# Patient Record
Sex: FEMALE | Race: WHITE | NOT HISPANIC OR LATINO | Employment: FULL TIME | URBAN - METROPOLITAN AREA
[De-identification: names, ages, dates, MRNs, and addresses within clinical notes are randomized per-mention and may not be internally consistent; named-entity substitution may affect disease eponyms.]

---

## 2018-05-22 ENCOUNTER — OFFICE VISIT (OUTPATIENT)
Dept: OBGYN CLINIC | Facility: MEDICAL CENTER | Age: 33
End: 2018-05-22
Payer: COMMERCIAL

## 2018-05-22 VITALS — DIASTOLIC BLOOD PRESSURE: 70 MMHG | SYSTOLIC BLOOD PRESSURE: 102 MMHG | BODY MASS INDEX: 30.12 KG/M2 | WEIGHT: 164.7 LBS

## 2018-05-22 DIAGNOSIS — N89.8 VAGINAL DISCHARGE: ICD-10-CM

## 2018-05-22 DIAGNOSIS — B96.89 BV (BACTERIAL VAGINOSIS): ICD-10-CM

## 2018-05-22 DIAGNOSIS — Z01.419 ENCOUNTER FOR ROUTINE GYNECOLOGICAL EXAMINATION WITH PAPANICOLAOU SMEAR OF CERVIX: Primary | ICD-10-CM

## 2018-05-22 DIAGNOSIS — N76.0 BV (BACTERIAL VAGINOSIS): ICD-10-CM

## 2018-05-22 PROCEDURE — S0610 ANNUAL GYNECOLOGICAL EXAMINA: HCPCS | Performed by: OBSTETRICS & GYNECOLOGY

## 2018-05-22 RX ORDER — METRONIDAZOLE 500 MG/1
500 TABLET ORAL EVERY 12 HOURS SCHEDULED
Qty: 14 TABLET | Refills: 0 | Status: SHIPPED | OUTPATIENT
Start: 2018-05-22 | End: 2018-05-29

## 2018-05-22 NOTE — PROGRESS NOTES
ASSESSMENT & PLAN: Zi Chaudhry is a 35 y o  [id number] with normal gynecologic exam     1   Routine well woman exam done today  2  Pap and HPV:   Pap and cotesting was done today  Current ASCCP Guidelines reviewed  / g/C sent from pap   4  The following were reviewed in today's visit: breast self exam, exercise and healthy diet  5  BV on wet mount and KOH : metronidazole sent to pharmacy    CC:  Annual Gynecologic Examination    HPI: Zi Chaudhry is a 35 y o  [id number] who presents for annual gynecologic examination  She has the following concerns:  External vulvar itching and some mild discharge     Health Maintenance:    She wears her seatbelt routinely  She does perform regular monthly self breast exams  She feels safe at home  History reviewed  No pertinent past medical history  Past Surgical History:   Procedure Laterality Date     SECTION      TUBAL LIGATION         Past OB/Gyn History:  OB History      Para Term  AB Living    3 3 3     3    SAB TAB Ectopic Multiple Live Births            3           Pt does not have menstrual issues  History of sexually transmitted infection: No   Patient is currently sexually active  The current method of family planning is tubal ligation  Family History   Problem Relation Age of Onset    Breast cancer Paternal Grandmother        Social History:  Social History     Social History    Marital status: Single     Spouse name: N/A    Number of children: N/A    Years of education: N/A     Occupational History    Not on file       Social History Main Topics    Smoking status: Current Every Day Smoker    Smokeless tobacco: Never Used    Alcohol use No    Drug use: No    Sexual activity: Yes     Partners: Male     Birth control/ protection: Female Sterilization     Other Topics Concern    Not on file     Social History Narrative    No narrative on file       No Known Allergies    Current Outpatient Prescriptions:    metroNIDAZOLE (FLAGYL) 500 mg tablet, Take 1 tablet (500 mg total) by mouth every 12 (twelve) hours for 7 days, Disp: 14 tablet, Rfl: 0    Review of Systems:    Review of Systems  Constitutional :no fever, feels well, no tiredness, no recent weight gain or loss  ENT: no ear ache, no loss of hearing, no nosebleeds or nasal discharge, no sore throat or hoarseness  Cardiovascular: no complaints of slow or fast heart beat, no chest pain, no palpitations, no leg claudication or lower extremity edema  Respiratory: no complaints of shortness of shortness of breath, no STERN  Breasts:no complaints of breast pain, breast lump, or nipple discharge  Gastrointestinal: no complaints of abdominal pain, constipation, nausea, vomiting, or diarrhea or bloody stools  Genitourinary :as noted in HPI  Musculoskeletal: no complaints of arthralgia, no myalgia, no joint swelling or stiffness, no limb pain or swelling  Integumentary: no complaints of skin rash or lesion, itching or dry skin  Neurological: no complaints of headache, no confusion, no numbness or tingling, no dizziness or fainting    Objective      /70   Wt 74 7 kg (164 lb 11 2 oz)   LMP 05/02/2018 (Exact Date)   BMI 30 12 kg/m²     General:   appears stated age, cooperative, alert normal mood and affect   Heart: regular rate and rhythm, S1, S2 normal, no murmur, click, rub or gallop   Lungs: clear to auscultation bilaterally   Breasts: normal appearance, no masses or tenderness, No axillary or supraclavicular adenopathy   Abdomen: soft, non-tender, without masses or organomegaly   Vulva: normal   Vagina: normal vagina/ large amount of greenish discharge    Urethra: normal   Cervix: Normal, no discharge  Nontender     Uterus: normal size, contour, position, consistency, mobility, non-tender   Adnexa: no mass, fullness, tenderness   Skin normal skin turgor and no rashes / lipoma on upper right back   Psychiatric orientation to person, place, and time: normal  mood and affect: normal

## 2018-06-18 ENCOUNTER — OFFICE VISIT (OUTPATIENT)
Dept: URGENT CARE | Age: 33
End: 2018-06-18
Payer: COMMERCIAL

## 2018-06-18 VITALS
HEIGHT: 60 IN | OXYGEN SATURATION: 99 % | TEMPERATURE: 100.2 F | RESPIRATION RATE: 16 BRPM | BODY MASS INDEX: 31.49 KG/M2 | SYSTOLIC BLOOD PRESSURE: 126 MMHG | DIASTOLIC BLOOD PRESSURE: 58 MMHG | HEART RATE: 100 BPM | WEIGHT: 160.4 LBS

## 2018-06-18 DIAGNOSIS — J02.0 STREP THROAT: Primary | ICD-10-CM

## 2018-06-18 PROCEDURE — 87147 CULTURE TYPE IMMUNOLOGIC: CPT | Performed by: PHYSICIAN ASSISTANT

## 2018-06-18 PROCEDURE — 87070 CULTURE OTHR SPECIMN AEROBIC: CPT | Performed by: PHYSICIAN ASSISTANT

## 2018-06-18 PROCEDURE — G0382 LEV 3 HOSP TYPE B ED VISIT: HCPCS | Performed by: PHYSICIAN ASSISTANT

## 2018-06-18 RX ORDER — PREDNISONE 20 MG/1
60 TABLET ORAL ONCE
Status: COMPLETED | OUTPATIENT
Start: 2018-06-18 | End: 2018-06-18

## 2018-06-18 RX ORDER — AMOXICILLIN 500 MG/1
500 CAPSULE ORAL EVERY 8 HOURS SCHEDULED
Qty: 21 CAPSULE | Refills: 0 | Status: SHIPPED | OUTPATIENT
Start: 2018-06-18 | End: 2018-06-25

## 2018-06-18 RX ADMIN — PREDNISONE 60 MG: 20 TABLET ORAL at 09:40

## 2018-06-18 NOTE — PROGRESS NOTES
[facility]        NAME: Sudhakar Thompson is a 35 y o  female  : 1985    MRN: [id number]  DATE: 2018  TIME: 9:41 AM    Assessment and Plan   Strep throat [J02 0]  1  Strep throat  Throat culture    predniSONE tablet 60 mg    amoxicillin (AMOXIL) 500 mg capsule         Patient Instructions     Take medications as directed  Drink plenty of fluids  Follow up with family doctor this week  Go to ER immediately if new or worsening symptoms occur  Chief Complaint     Chief Complaint   Patient presents with    Sore Throat     Pt reports sore throat x 1 day, with swollen lymph nodes in right neck  Took Aleve this morning  History of Present Illness       Sore Throat    This is a new problem  Episode onset: Three days ago  The problem has been rapidly worsening  The maximum temperature recorded prior to her arrival was 100 4 - 100 9 F  The fever has been present for 1 to 2 days  The pain is moderate  Associated symptoms include ear pain (R), a hoarse voice and swollen glands  Pertinent negatives include no abdominal pain, congestion, coughing, diarrhea, drooling, ear discharge, headaches, plugged ear sensation, neck pain, shortness of breath, stridor, trouble swallowing or vomiting  She has had no exposure to strep or mono  She has tried NSAIDs for the symptoms  The treatment provided mild relief  No shortness of breath, no sensation of throat closure  Review of Systems   Review of Systems   Constitutional: Negative for chills, diaphoresis, fatigue and fever  HENT: Positive for ear pain (R), hoarse voice and sore throat  Negative for congestion, drooling, ear discharge, rhinorrhea, sinus pain, sinus pressure, sneezing, trouble swallowing and voice change  Eyes: Negative  Respiratory: Negative for cough, chest tightness, shortness of breath and stridor  Cardiovascular: Negative for chest pain and palpitations     Gastrointestinal: Negative for abdominal pain, constipation, diarrhea, nausea and vomiting  Endocrine: Negative  Genitourinary: Negative for dysuria  Musculoskeletal: Negative for back pain, myalgias and neck pain  Skin: Negative for pallor and rash  Allergic/Immunologic: Negative  Neurological: Negative for dizziness, syncope and headaches  Hematological: Negative  Psychiatric/Behavioral: Negative  Current Medications       Current Outpatient Prescriptions:     amoxicillin (AMOXIL) 500 mg capsule, Take 1 capsule (500 mg total) by mouth every 8 (eight) hours for 7 days, Disp: 21 capsule, Rfl: 0  No current facility-administered medications for this visit  Current Allergies     Allergies as of 2018    (No Known Allergies)            The following portions of the patient's history were reviewed and updated as appropriate: allergies, current medications, past family history, past medical history, past social history, past surgical history and problem list      History reviewed  No pertinent past medical history  Past Surgical History:   Procedure Laterality Date     SECTION      TUBAL LIGATION         Family History   Problem Relation Age of Onset    Breast cancer Paternal Grandmother          Medications have been verified  Objective   /58   Pulse 100   Temp 100 2 °F (37 9 °C) (Tympanic)   Resp 16   Ht 5' (1 524 m)   Wt 72 8 kg (160 lb 6 4 oz)   LMP 2018   SpO2 99%   BMI 31 33 kg/m²        Physical Exam     Physical Exam   Constitutional: She appears well-developed and well-nourished  No distress  HENT:   Head: Normocephalic and atraumatic  Right Ear: External ear normal    Left Ear: External ear normal    Nose: Nose normal    Mouth/Throat: Oropharyngeal exudate present  Large beefy tonsils bilaterally  Not kissing  Airway patent  Patient handling secretions  Eyes: Conjunctivae are normal  Right eye exhibits no discharge  Left eye exhibits no discharge  Neck: Normal range of motion  Neck supple  Cardiovascular: Normal rate, regular rhythm, normal heart sounds and intact distal pulses  Pulmonary/Chest: Effort normal and breath sounds normal  No respiratory distress  She has no wheezes  She has no rales  Lymphadenopathy:     She has no cervical adenopathy  Skin: Skin is warm  No rash noted  She is not diaphoretic  Nursing note and vitals reviewed

## 2018-06-18 NOTE — PATIENT INSTRUCTIONS
Take medications as directed  Drink plenty of fluids  Follow up with family doctor this week  Go to ER immediately if new or worsening symptoms occur  Strep Throat   WHAT YOU NEED TO KNOW:   Strep throat is a throat infection caused by bacteria  It is easily spread from person to person  DISCHARGE INSTRUCTIONS:   Call 911 for any of the following:   · You have trouble breathing  Return to the emergency department if:   · You have new symptoms like a bad headache, stiff neck, chest pain, or vomiting  · You are drooling because you cannot swallow your spit  Contact your healthcare provider if:   · You have a fever  · You have a rash or ear pain  · You have green, yellow-brown, or bloody mucus when you cough or blow your nose  · You are unable to drink anything  · You have questions or concerns about your condition or care  Medicines:   · Antibiotics  help treat your strep throat  You should feel better within 2 to 3 days after you start antibiotics  · Take your medicine as directed  Contact your healthcare provider if you think your medicine is not helping or if you have side effects  Tell him or her if you are allergic to any medicine  Keep a list of the medicines, vitamins, and herbs you take  Include the amounts, and when and why you take them  Bring the list or the pill bottles to follow-up visits  Carry your medicine list with you in case of an emergency  Manage your symptoms:   · Use lozenges, ice, soft foods, or popsicles  to soothe your throat  · Drink juice, milk shakes, or soup  if your throat is too sore to eat solid food  Drinking liquids can also help prevent dehydration  · Gargle with salt water  Mix ¼ teaspoon salt in a glass of warm water and gargle  This may help reduce swelling in your throat  · Do not smoke  Nicotine and other chemicals in cigarettes and cigars can cause lung damage and make your symptoms worse   Ask your healthcare provider for information if you currently smoke and need help to quit  E-cigarettes or smokeless tobacco still contain nicotine  Talk to your healthcare provider before you use these products  Return to work or school  24 hours after you start antibiotic medicine  Prevent the spread of strep throat:   · Wash your hands often  Use soap and water  Wash your hands after you use the bathroom, change a child's diapers, or sneeze  Wash your hands before you prepare or eat food  · Do not share food or drinks  Replace your toothbrush after you have taken antibiotics for 24 hours  Follow up with your healthcare provider as directed:  Write down your questions so you remember to ask them during your visits  © 2017 [facility] Information is for End User's use only and may not be sold, redistributed or otherwise used for commercial purposes  All illustrations and images included in CareNotes® are the copyrighted property of [employer]  or [employer]  The above information is an  only  It is not intended as medical advice for individual conditions or treatments  Talk to your doctor, nurse or pharmacist before following any medical regimen to see if it is safe and effective for you

## 2018-06-18 NOTE — LETTER
June 18, 2018     Patient: Dain Loza   YOB: 1985   Date of Visit: 6/18/2018       To Whom It May Concern: It is my medical opinion that Dain Loza may return to work on 06/19/2018  If you have any questions or concerns, please don't hesitate to call           Sincerely,        Mady Gallardo PA-C    CC: No Recipients

## 2018-06-19 LAB — BACTERIA THROAT CULT: ABNORMAL

## 2018-09-25 ENCOUNTER — OFFICE VISIT (OUTPATIENT)
Dept: URGENT CARE | Age: 33
End: 2018-09-25
Payer: COMMERCIAL

## 2018-09-25 VITALS
OXYGEN SATURATION: 98 % | SYSTOLIC BLOOD PRESSURE: 127 MMHG | HEART RATE: 92 BPM | WEIGHT: 157.8 LBS | RESPIRATION RATE: 20 BRPM | BODY MASS INDEX: 30.98 KG/M2 | HEIGHT: 60 IN | DIASTOLIC BLOOD PRESSURE: 59 MMHG | TEMPERATURE: 99.6 F

## 2018-09-25 DIAGNOSIS — J02.9 ACUTE PHARYNGITIS, UNSPECIFIED ETIOLOGY: Primary | ICD-10-CM

## 2018-09-25 PROCEDURE — G0382 LEV 3 HOSP TYPE B ED VISIT: HCPCS | Performed by: PHYSICIAN ASSISTANT

## 2018-09-25 RX ORDER — AMOXICILLIN 500 MG/1
1000 CAPSULE ORAL EVERY 24 HOURS
Qty: 20 CAPSULE | Refills: 0 | Status: SHIPPED | OUTPATIENT
Start: 2018-09-25 | End: 2018-10-05

## 2018-09-25 NOTE — LETTER
September 25, 2018     Patient: Claudine Bravo   YOB: 1985   Date of Visit: 9/25/2018       To Whom it May Concern:    Claudine Bravo was seen in my clinic on 9/25/2018  She may return to work on 09/26/2018  If you have any questions or concerns, please don't hesitate to call  Sincerely,          Kofi Erickson PA-C        CC: No Recipients

## 2018-09-25 NOTE — PROGRESS NOTES
[facility]        NAME: Sergio Bradshaw is a 35 y o  female  : 1985    MRN: [id number]  DATE: 2018  TIME: 9:44 AM    Assessment and Plan   Acute pharyngitis, unspecified etiology [J02 9]  1  Acute pharyngitis, unspecified etiology  amoxicillin (AMOXIL) 500 mg capsule         Patient Instructions     Motrin and/or Tylenol as needed for pain control or fevers  Take antibiotic as directed until completed  Follow up with PCP in 3-5 days  Proceed to  ER if symptoms worsen  Chief Complaint     Chief Complaint   Patient presents with    Sore Throat     patient reports she has had a sore throat since , started with productive cough of yellow/brown sputum this am  Fever on , low grade today  did not take anything for symptoms today  History of Present Illness       24-year-old female presents with sore throats body aches pains fevers since   Reports that she has had a mild cough  No nausea vomiting diarrhea  Sore Throat    This is a recurrent problem  The current episode started in the past 7 days  The problem has been waxing and waning  The pain is worse on the left side  Maximum temperature: Subjective  The fever has been present for 1 to 2 days  The pain is moderate  Associated symptoms include coughing and swollen glands  Pertinent negatives include no abdominal pain, congestion, diarrhea, drooling, ear discharge, ear pain, hoarse voice, neck pain, shortness of breath, stridor or vomiting  She has tried acetaminophen for the symptoms  The treatment provided moderate relief  Review of Systems   Review of Systems   Constitutional: Positive for fatigue and fever  HENT: Positive for sore throat  Negative for congestion, drooling, ear discharge, ear pain and hoarse voice  Eyes: Negative  Respiratory: Positive for cough  Negative for shortness of breath and stridor  Cardiovascular: Negative  Gastrointestinal: Negative    Negative for abdominal pain, diarrhea and vomiting  Musculoskeletal: Negative  Negative for neck pain  Skin: Negative  Neurological: Negative  Hematological: Positive for adenopathy  Current Medications       Current Outpatient Prescriptions:     amoxicillin (AMOXIL) 500 mg capsule, Take 2 capsules (1,000 mg total) by mouth every 24 hours for 10 days, Disp: 20 capsule, Rfl: 0    Current Allergies     Allergies as of 2018    (No Known Allergies)            The following portions of the patient's history were reviewed and updated as appropriate: allergies, current medications, past family history, past medical history, past social history, past surgical history and problem list      History reviewed  No pertinent past medical history  Past Surgical History:   Procedure Laterality Date     SECTION      TUBAL LIGATION         Family History   Problem Relation Age of Onset    Breast cancer Paternal Grandmother          Medications have been verified  Objective   /59   Pulse 92   Temp 99 6 °F (37 6 °C)   Resp 20   Ht 5' (1 524 m)   Wt 71 6 kg (157 lb 12 8 oz)   LMP 2018   SpO2 98%   BMI 30 82 kg/m²        Physical Exam     Physical Exam   Constitutional: She is oriented to person, place, and time  She appears well-developed and well-nourished  No distress  HENT:   Head: Normocephalic and atraumatic  Right Ear: External ear normal    Left Ear: External ear normal    Nose: Nose normal    Mouth/Throat: Uvula is midline and mucous membranes are normal  Posterior oropharyngeal erythema present  No oropharyngeal exudate or posterior oropharyngeal edema  Eyes: Conjunctivae are normal  Right eye exhibits no discharge  Left eye exhibits no discharge  Neck: Normal range of motion  Neck supple  Cardiovascular: Normal rate, regular rhythm, normal heart sounds and intact distal pulses  No murmur heard    Pulmonary/Chest: Effort normal and breath sounds normal  No respiratory distress  She has no wheezes  She has no rales  Abdominal: Soft  Bowel sounds are normal  There is no tenderness  Musculoskeletal: Normal range of motion  Lymphadenopathy:     She has cervical adenopathy  Neurological: She is alert and oriented to person, place, and time  Skin: Skin is warm and dry  Psychiatric: She has a normal mood and affect  Nursing note and vitals reviewed

## 2018-09-25 NOTE — PATIENT INSTRUCTIONS
Motrin and/or Tylenol as needed for pain control or fevers  Take antibiotic as directed until completed  Follow up with PCP in 3-5 days  Proceed to  ER if symptoms worsen  Pharyngitis   AMBULATORY CARE:   Pharyngitis , or sore throat, is inflammation of the tissues and structures in your pharynx (throat)  Pharyngitis is most often caused by bacteria  It may also be caused by a cold or flu virus  Other causes include smoking, allergies, or acid reflux  Signs and symptoms that may occur with pharyngitis:   · Sore throat or pain when you swallow    · Fever, chills, and body aches    · Hoarse or raspy voice    · Cough, runny or stuffy nose, itchy or watery eyes    · Headache    · Upset stomach and loss of appetite    · Mild neck stiffness    · Swollen glands that feel like hard lumps when you touch your neck    · White and yellow pus-filled blisters in the back of your throat  Call 911 for any of the following:   · You have trouble breathing or swallowing because your throat is swollen or sore  Seek care immediately if:   · You are drooling because it hurts too much to swallow  · Your fever is higher than 102? F (39?C) or lasts longer than 3 days  · You are confused  · You taste blood in your throat  Contact your healthcare provider if:   · Your throat pain gets worse  · You have a painful lump in your throat that does not go away after 5 days  · Your symptoms do not improve after 5 days  · You have questions or concerns about your condition or care  Treatment for pharyngitis:  Viral pharyngitis will go away on its own without treatment  Your sore throat should start to feel better in 3 to 5 days for both viral and bacterial infections  You may need any of the following:  · Antibiotics  treat a bacterial infection  · NSAIDs , such as ibuprofen, help decrease swelling, pain, and fever  NSAIDs can cause stomach bleeding or kidney problems in certain people   If you take blood thinner medicine, always ask your healthcare provider if NSAIDs are safe for you  Always read the medicine label and follow directions  · Acetaminophen  decreases pain and fever  It is available without a doctor's order  Ask how much to take and how often to take it  Follow directions  Acetaminophen can cause liver damage if not taken correctly  Manage your symptoms:   · Gargle salt water  Mix ¼ teaspoon salt in an 8 ounce glass of warm water and gargle  This may help decrease swelling in your throat  · Drink liquids as directed  You may need to drink more liquids than usual  Liquids may help soothe your throat and prevent dehydration  Ask how much liquid to drink each day and which liquids are best for you  · Use a cool-steam humidifier  to help moisten the air in your room and calm your cough  · Soothe your throat  with cough drops, ice, soft foods, or popsicles  Prevent the spread of pharyngitis:  Cover your mouth and nose when you cough or sneeze  Do not share food or drinks  Wash your hands often  Use soap and water  If soap and water are unavailable, use an alcohol based hand   Follow up with your healthcare provider as directed:  Write down your questions so you remember to ask them during your visits  © 2017 [facility] Information is for End User's use only and may not be sold, redistributed or otherwise used for commercial purposes  All illustrations and images included in CareNotes® are the copyrighted property of [employer]  or [employer]es  The above information is an  only  It is not intended as medical advice for individual conditions or treatments  Talk to your doctor, nurse or pharmacist before following any medical regimen to see if it is safe and effective for you

## 2019-06-13 ENCOUNTER — ANNUAL EXAM (OUTPATIENT)
Dept: OBGYN CLINIC | Facility: MEDICAL CENTER | Age: 34
End: 2019-06-13
Payer: COMMERCIAL

## 2019-06-13 VITALS — SYSTOLIC BLOOD PRESSURE: 130 MMHG | BODY MASS INDEX: 33.79 KG/M2 | DIASTOLIC BLOOD PRESSURE: 65 MMHG | WEIGHT: 173 LBS

## 2019-06-13 DIAGNOSIS — A63.0 PERIANAL VENEREAL WARTS: Primary | ICD-10-CM

## 2019-06-13 DIAGNOSIS — N94.9 GENITAL LESION, FEMALE: ICD-10-CM

## 2019-06-13 DIAGNOSIS — Z01.419 ENCOUNTER FOR GYNECOLOGICAL EXAMINATION WITH PAPANICOLAOU SMEAR OF CERVIX: ICD-10-CM

## 2019-06-13 PROCEDURE — 99213 OFFICE O/P EST LOW 20 MIN: CPT | Performed by: OBSTETRICS & GYNECOLOGY

## 2019-06-13 PROCEDURE — 99395 PREV VISIT EST AGE 18-39: CPT | Performed by: OBSTETRICS & GYNECOLOGY

## 2019-06-13 PROCEDURE — 11102 TANGNTL BX SKIN SINGLE LES: CPT | Performed by: OBSTETRICS & GYNECOLOGY

## 2019-06-26 ENCOUNTER — OFFICE VISIT (OUTPATIENT)
Dept: OBGYN CLINIC | Facility: MEDICAL CENTER | Age: 34
End: 2019-06-26
Payer: COMMERCIAL

## 2019-06-26 VITALS — WEIGHT: 168.2 LBS | SYSTOLIC BLOOD PRESSURE: 102 MMHG | BODY MASS INDEX: 32.85 KG/M2 | DIASTOLIC BLOOD PRESSURE: 64 MMHG

## 2019-06-26 DIAGNOSIS — A63.0 CONDYLOMA ACUMINATA: Primary | ICD-10-CM

## 2019-06-26 PROCEDURE — 99213 OFFICE O/P EST LOW 20 MIN: CPT | Performed by: OBSTETRICS & GYNECOLOGY

## 2019-06-26 RX ORDER — IMIQUIMOD 12.5 MG/.25G
1 CREAM TOPICAL 3 TIMES WEEKLY
Qty: 12 EACH | Refills: 2 | Status: SHIPPED | OUTPATIENT
Start: 2019-06-26 | End: 2019-10-15

## 2019-09-25 ENCOUNTER — OFFICE VISIT (OUTPATIENT)
Dept: URGENT CARE | Age: 34
End: 2019-09-25
Payer: COMMERCIAL

## 2019-09-25 VITALS
BODY MASS INDEX: 32.98 KG/M2 | HEIGHT: 60 IN | RESPIRATION RATE: 16 BRPM | OXYGEN SATURATION: 97 % | SYSTOLIC BLOOD PRESSURE: 111 MMHG | WEIGHT: 168 LBS | TEMPERATURE: 99.9 F | HEART RATE: 62 BPM | DIASTOLIC BLOOD PRESSURE: 78 MMHG

## 2019-09-25 DIAGNOSIS — H60.501 ACUTE OTITIS EXTERNA OF RIGHT EAR, UNSPECIFIED TYPE: Primary | ICD-10-CM

## 2019-09-25 PROCEDURE — G0382 LEV 3 HOSP TYPE B ED VISIT: HCPCS | Performed by: NURSE PRACTITIONER

## 2019-09-25 RX ORDER — OFLOXACIN 3 MG/ML
10 SOLUTION AURICULAR (OTIC) DAILY
Qty: 5 ML | Refills: 0 | Status: SHIPPED | OUTPATIENT
Start: 2019-09-25 | End: 2019-12-06 | Stop reason: ALTCHOICE

## 2019-09-25 NOTE — PATIENT INSTRUCTIONS
Otitis Externa   WHAT YOU NEED TO KNOW:   Otitis externa, or swimmer's ear, is an infection in the outer ear canal  This canal goes from the outside of the ear to the eardrum  DISCHARGE INSTRUCTIONS:   Return to the emergency department if:   · You have severe ear pain  · You are suddenly unable to hear at all  · You have new swelling in your face, behind your ears, or in your neck  · You suddenly cannot move part of your face  · Your face suddenly feels numb  Contact your healthcare provider if:   · You have a fever  · Your signs and symptoms do not get better after 2 days of treatment  · Your signs and symptoms go away for a time, but then come back  · You have questions or concerns about your condition or care  Medicines:   · NSAIDs , such as ibuprofen, help decrease swelling, pain, and fever  This medicine is available with or without a doctor's order  NSAIDs can cause stomach bleeding or kidney problems in certain people  If you take blood thinner medicine, always ask if NSAIDs are safe for you  Always read the medicine label and follow directions  Do not give these medicines to children under 10months of age without direction from your child's healthcare provider  · Acetaminophen  decreases pain and fever  It is available without a doctor's order  Ask how much to take and how often to take it  Follow directions  Acetaminophen can cause liver damage if not taken correctly  · Ear drops  that contain an antibiotic may be given  The antibiotic helps treat a bacterial infection  You may also be given steroid medicine  The steroid helps decrease redness, swelling, and pain  · Take your medicine as directed  Contact your healthcare provider if you think your medicine is not helping or if you have side effects  Tell him or her if you are allergic to any medicine  Keep a list of the medicines, vitamins, and herbs you take  Include the amounts, and when and why you take them  Bring the list or the pill bottles to follow-up visits  Carry your medicine list with you in case of an emergency  Follow up with your healthcare provider as directed:  Write down your questions so you remember to ask them during your visits  How to use eardrops:   · Lie down on your side with your infected ear facing up  · Carefully drip the correct number of eardrops into your ear  Have another person help you if possible  · Gently move the outside part of your ear back and forth to help the medicine reach your ear canal      · Stay lying down in the same position (with your ear facing up) for 3 to 5 minutes  Prevent otitis externa:   · Do not put cotton swabs or foreign objects in your ears  · Wrap a clean moist washcloth around your finger, and use it to clean your outer ear and remove extra ear wax  · Use ear plugs when you swim  Dry your outer ears completely after you swim or bathe  © 2017 [address] Information is for End User's use only and may not be sold, redistributed or otherwise used for commercial purposes  All illustrations and images included in CareNotes® are the copyrighted property of [employer]  or [employer]  The above information is an  only  It is not intended as medical advice for individual conditions or treatments  Talk to your doctor, nurse or pharmacist before following any medical regimen to see if it is safe and effective for you  We offer over-the-counter meds that can be purchased here at the office for your convenience       Cough and Cold Meds:   Mucinex for $14 00   Mucinex DM for $14 00   Delsym for $14 dollars   Cepacol Extra strength for $4 00,     Allergy Medicine  Bendaryl for $4 00  Cetrizine (Zyrtec) for $4 00,     Pain Relief  Ibuprofen (motrin) for $4 00  Acetaminophen (tylenol) for $4 00  Childrens tylenol and motrin for $4 00    Itch and Rash Relief  % Hydrocortisone Cream for $4 00

## 2019-09-25 NOTE — PROGRESS NOTES
NAME: Natty Ortiz is a 29 y o  female  : 1985    MRN: [id number]      Assessment and Plan   Acute otitis externa of right ear, unspecified type [H60 501]  1  Acute otitis externa of right ear, unspecified type  ofloxacin (FLOXIN) 0 3 % otic solution       Barrera Lawton was seen today for earache  Diagnoses and all orders for this visit:    Acute otitis externa of right ear, unspecified type  -     ofloxacin (FLOXIN) 0 3 % otic solution; Administer 10 drops to the right ear daily        Patient Instructions   Patient Instructions     Otitis Externa   WHAT YOU NEED TO KNOW:   Otitis externa, or swimmer's ear, is an infection in the outer ear canal  This canal goes from the outside of the ear to the eardrum  DISCHARGE INSTRUCTIONS:   Return to the emergency department if:   · You have severe ear pain  · You are suddenly unable to hear at all  · You have new swelling in your face, behind your ears, or in your neck  · You suddenly cannot move part of your face  · Your face suddenly feels numb  Contact your healthcare provider if:   · You have a fever  · Your signs and symptoms do not get better after 2 days of treatment  · Your signs and symptoms go away for a time, but then come back  · You have questions or concerns about your condition or care  Medicines:   · NSAIDs , such as ibuprofen, help decrease swelling, pain, and fever  This medicine is available with or without a doctor's order  NSAIDs can cause stomach bleeding or kidney problems in certain people  If you take blood thinner medicine, always ask if NSAIDs are safe for you  Always read the medicine label and follow directions  Do not give these medicines to children under 10months of age without direction from your child's healthcare provider  · Acetaminophen  decreases pain and fever  It is available without a doctor's order  Ask how much to take and how often to take it  Follow directions   Acetaminophen can cause liver damage if not taken correctly  · Ear drops  that contain an antibiotic may be given  The antibiotic helps treat a bacterial infection  You may also be given steroid medicine  The steroid helps decrease redness, swelling, and pain  · Take your medicine as directed  Contact your healthcare provider if you think your medicine is not helping or if you have side effects  Tell him or her if you are allergic to any medicine  Keep a list of the medicines, vitamins, and herbs you take  Include the amounts, and when and why you take them  Bring the list or the pill bottles to follow-up visits  Carry your medicine list with you in case of an emergency  Follow up with your healthcare provider as directed:  Write down your questions so you remember to ask them during your visits  How to use eardrops:   · Lie down on your side with your infected ear facing up  · Carefully drip the correct number of eardrops into your ear  Have another person help you if possible  · Gently move the outside part of your ear back and forth to help the medicine reach your ear canal      · Stay lying down in the same position (with your ear facing up) for 3 to 5 minutes  Prevent otitis externa:   · Do not put cotton swabs or foreign objects in your ears  · Wrap a clean moist washcloth around your finger, and use it to clean your outer ear and remove extra ear wax  · Use ear plugs when you swim  Dry your outer ears completely after you swim or bathe  © 2017 [employer] Information is for End User's use only and may not be sold, redistributed or otherwise used for commercial purposes  All illustrations and images included in CareNotes® are the copyrighted property of A D A M , Inc  or Edy Estes  The above information is an  only  It is not intended as medical advice for individual conditions or treatments   Talk to your doctor, nurse or pharmacist before following any medical regimen to see if it is safe and effective for you  We offer over-the-counter meds that can be purchased here at the office for your convenience  Cough and Cold Meds:   Mucinex for $14 00   Mucinex DM for $14 00   Delsym for $14 dollars   Cepacol Extra strength for $4 00,     Allergy Medicine  Bendaryl for $4 00  Cetrizine (Zyrtec) for $4 00,     Pain Relief  Ibuprofen (motrin) for $4 00  Acetaminophen (tylenol) for $4 00  Childrens tylenol and motrin for $4 00    Itch and Rash Relief  % Hydrocortisone Cream for $4 00        Proceed to ER if symptoms worsen  Chief Complaint     Chief Complaint   Patient presents with    Earache     PT c/o right ear pain and swelling about 4 days; PT states she feels warm fluid draining along with puss from the right ear  Has a low grade fever; PT took tylenol this morning around 9:45am  Denies sore throat, headache or sinus pressure; No cough or congestion  History of Present Illness     Pt here today with right ear pain and swelling for four days  She has some fluid drain from the right ear last night, has low grade fever and denies sinus pressure, sore throat cough and congestion  Pt denies trauma to the ear and denies having any FB in the right ear  Earache    There is pain in the right ear  This is a new problem  The current episode started in the past 7 days  The problem has been gradually worsening  There has been no fever  The pain is mild  Associated symptoms include ear discharge  Pertinent negatives include no abdominal pain, coughing, diarrhea, headaches, hearing loss, neck pain, rash, rhinorrhea, sore throat or vomiting  She has tried acetaminophen for the symptoms  The treatment provided mild relief  There is no history of a chronic ear infection, hearing loss or a tympanostomy tube  Review of Systems   Review of Systems   Constitutional: Negative for fever  HENT: Positive for congestion, ear discharge and ear pain   Negative for hearing loss, postnasal drip, rhinorrhea, sinus pressure and sore throat  Eyes: Negative for discharge  Respiratory: Negative  Negative for cough  Gastrointestinal: Negative for abdominal pain, diarrhea and vomiting  Musculoskeletal: Negative for neck pain  Skin: Negative for rash  Neurological: Negative for headaches  Current Medications       Current Outpatient Medications:     imiquimod (ALDARA) 5 % cream, Apply 1 packet topically 3 (three) times a week for 48 doses (Patient not taking: Reported on 2019), Disp: 12 each, Rfl: 2    ofloxacin (FLOXIN) 0 3 % otic solution, Administer 10 drops to the right ear daily, Disp: 5 mL, Rfl: 0    Current Allergies     Allergies as of 2019    (No Known Allergies)              History reviewed  No pertinent past medical history  Past Surgical History:   Procedure Laterality Date     SECTION      TUBAL LIGATION         Family History   Problem Relation Age of Onset    Breast cancer Paternal Grandmother          Medications have been verified  The following portions of the patient's history were reviewed and updated as appropriate: allergies, current medications, past family history, past medical history, past social history, past surgical history and problem list     Objective   /78   Pulse 62   Temp 99 9 °F (37 7 °C)   Resp 16   Ht 5' (1 524 m)   Wt 76 2 kg (168 lb)   SpO2 97%   BMI 32 81 kg/m²      Physical Exam     Physical Exam   Constitutional: She appears well-developed and well-nourished  HENT:   Head: Normocephalic  Right Ear: There is drainage, swelling and tenderness  Tympanic membrane is bulging  Decreased hearing is noted  Left Ear: Tympanic membrane, external ear and ear canal normal  No decreased hearing is noted  Nose: Nose normal  Right sinus exhibits no maxillary sinus tenderness and no frontal sinus tenderness     Mouth/Throat: Uvula is midline and oropharynx is clear and moist    Eyes: Lids are normal    Cardiovascular: Normal rate and regular rhythm     Pulmonary/Chest: Effort normal and breath sounds normal        Ayesha Legacy, CRNP

## 2019-12-06 ENCOUNTER — OFFICE VISIT (OUTPATIENT)
Dept: URGENT CARE | Facility: CLINIC | Age: 34
End: 2019-12-06
Payer: COMMERCIAL

## 2019-12-06 VITALS
DIASTOLIC BLOOD PRESSURE: 58 MMHG | BODY MASS INDEX: 32.39 KG/M2 | SYSTOLIC BLOOD PRESSURE: 90 MMHG | RESPIRATION RATE: 18 BRPM | HEIGHT: 60 IN | HEART RATE: 78 BPM | WEIGHT: 165 LBS | TEMPERATURE: 99.4 F | OXYGEN SATURATION: 97 %

## 2019-12-06 DIAGNOSIS — H92.11 EAR DRAINAGE RIGHT: Primary | ICD-10-CM

## 2019-12-06 PROCEDURE — G0382 LEV 3 HOSP TYPE B ED VISIT: HCPCS | Performed by: PHYSICIAN ASSISTANT

## 2019-12-06 NOTE — PROGRESS NOTES
[facility]        NAME: Shaniqua Hanna is a 29 y o  female  : 1985    MRN: [id number]  DATE: 2019  TIME: 4:22 PM    Assessment and Plan   Ear drainage right [H92 11]  1  Ear drainage right           Patient Instructions     Recommend pt see PCP for follow up and possible ENT evaluation  Follow up with PCP in 3-5 days  Proceed to  ER if symptoms worsen  Chief Complaint     Chief Complaint   Patient presents with    Ear Drainage     s/p right earache (seen and treated on  at [facility] for acute otitis externa of right ear); has experienced serous cloudy drainage since 19 until this am when discharge became renato blood  No associated pain or change in hearing  History of Present Illness       Patient presents with complaint of bleeding from right ear since this AM  Pt reports that she had a bad ear infection in September and since then she has been having discharge, sometimes blood-tinged, from her right ear; however, today she experienced renato blood from the ear  She denies any trauma, recent travel, or ear foreign bodies/instrumentation  She denies any ear pain, fever, chills, night sweats, headache, congestion, hearing loss, rhinorrhea, sore throat  Pt denies trying palliative measures  She denies ever having this previously  Review of Systems   Review of Systems   Constitutional: Negative for chills, fatigue and fever  HENT: Positive for ear discharge  Negative for congestion, dental problem, ear pain, postnasal drip, rhinorrhea, sinus pressure, sinus pain and sore throat  Respiratory: Negative for cough, chest tightness, shortness of breath and wheezing  Cardiovascular: Negative for chest pain and palpitations  Musculoskeletal: Negative for myalgias  Skin: Negative for color change, rash and wound  Neurological: Negative for headaches  All other systems reviewed and are negative          Current Medications       Current Outpatient Medications:   imiquimod (ALDARA) 5 % cream, Apply 1 packet topically 3 (three) times a week for 48 doses (Patient not taking: Reported on 2019), Disp: 12 each, Rfl: 2    Current Allergies     Allergies as of 2019    (No Known Allergies)            The following portions of the patient's history were reviewed and updated as appropriate: allergies, current medications, past family history, past medical history, past social history, past surgical history and problem list      History reviewed  No pertinent past medical history  Past Surgical History:   Procedure Laterality Date     SECTION      TUBAL LIGATION         Family History   Problem Relation Age of Onset    Breast cancer Paternal Grandmother          Medications have been verified  Objective   BP 90/58   Pulse 78   Temp 99 4 °F (37 4 °C)   Resp 18   Ht 5' 0 25" (1 53 m)   Wt 74 8 kg (165 lb)   SpO2 97%   BMI 31 96 kg/m²        Physical Exam     Physical Exam   Constitutional: She is oriented to person, place, and time  She appears well-developed and well-nourished  No distress  HENT:   Head: Normocephalic and atraumatic  Left Ear: External ear normal    Nose: Nose normal    Mouth/Throat: Oropharynx is clear and moist  No oropharyngeal exudate  Small amount of renato blood visible in right external ear canal, no visible wound; no visible TM perforation; no edema or purulent material   Eyes: Pupils are equal, round, and reactive to light  Conjunctivae and EOM are normal    Neck: Normal range of motion  Neck supple  Cardiovascular: Normal rate, regular rhythm and normal heart sounds  Pulmonary/Chest: Effort normal and breath sounds normal  No respiratory distress  Lymphadenopathy:     She has no cervical adenopathy  Neurological: She is alert and oriented to person, place, and time  No cranial nerve deficit or sensory deficit  Skin: Skin is warm and dry  Capillary refill takes less than 2 seconds  No rash noted  She is not diaphoretic  Psychiatric: She has a normal mood and affect  Her behavior is normal  Thought content normal    Nursing note and vitals reviewed

## 2022-06-06 ENCOUNTER — ANNUAL EXAM (OUTPATIENT)
Dept: OBGYN CLINIC | Facility: MEDICAL CENTER | Age: 37
End: 2022-06-06
Payer: COMMERCIAL

## 2022-06-06 ENCOUNTER — APPOINTMENT (OUTPATIENT)
Dept: LAB | Facility: MEDICAL CENTER | Age: 37
End: 2022-06-06
Payer: COMMERCIAL

## 2022-06-06 VITALS
DIASTOLIC BLOOD PRESSURE: 60 MMHG | BODY MASS INDEX: 30.04 KG/M2 | HEIGHT: 60 IN | SYSTOLIC BLOOD PRESSURE: 114 MMHG | WEIGHT: 153 LBS

## 2022-06-06 DIAGNOSIS — Z01.419 ENCOUNTER FOR WELL WOMAN EXAM WITH ROUTINE GYNECOLOGICAL EXAM: ICD-10-CM

## 2022-06-06 DIAGNOSIS — Z01.419 ENCOUNTER FOR WELL WOMAN EXAM WITH ROUTINE GYNECOLOGICAL EXAM: Primary | ICD-10-CM

## 2022-06-06 DIAGNOSIS — N93.9 ABNORMAL UTERINE BLEEDING (AUB): ICD-10-CM

## 2022-06-06 LAB — HBV SURFACE AG SER QL: NORMAL

## 2022-06-06 PROCEDURE — 87491 CHLMYD TRACH DNA AMP PROBE: CPT | Performed by: OBSTETRICS & GYNECOLOGY

## 2022-06-06 PROCEDURE — 87389 HIV-1 AG W/HIV-1&-2 AB AG IA: CPT

## 2022-06-06 PROCEDURE — 88305 TISSUE EXAM BY PATHOLOGIST: CPT | Performed by: PATHOLOGY

## 2022-06-06 PROCEDURE — 86592 SYPHILIS TEST NON-TREP QUAL: CPT

## 2022-06-06 PROCEDURE — 36415 COLL VENOUS BLD VENIPUNCTURE: CPT

## 2022-06-06 PROCEDURE — 87591 N.GONORRHOEAE DNA AMP PROB: CPT | Performed by: OBSTETRICS & GYNECOLOGY

## 2022-06-06 PROCEDURE — 99395 PREV VISIT EST AGE 18-39: CPT | Performed by: OBSTETRICS & GYNECOLOGY

## 2022-06-06 PROCEDURE — 87340 HEPATITIS B SURFACE AG IA: CPT

## 2022-06-06 RX ORDER — TRETINOIN 0.5 MG/G
CREAM TOPICAL
COMMUNITY
Start: 2022-03-14

## 2022-06-06 NOTE — PROGRESS NOTES
A/P    1  Annual exam    Last PAP - 6/13/2019- neg neg   Next due 2024   Scheduling of pap discussed in detail     2  Cycles long 7 days and spotting between the cycle   X 6 years with tubal    Heavy cycles  She reports that started after her c s when she had a tubal ligation    We discussed options     Sample and sonogram    She wants all results before moving to any definitive treatment     3  STD screening           37 y o ,Patient's last menstrual period was 05/16/2022 (exact date)  C/O AUB        Past medical / social / surgical / family history reviewed and updated   Medication and allergies discussed in detail and updated     Review of Systems - History obtained from chart review and the patient  General ROS: negative  Psychological ROS: negative  Ophthalmic ROS: negative  ENT ROS: negative  Allergy and Immunology ROS: negative  Hematological and Lymphatic ROS: negative  Endocrine ROS: negative  Breast ROS: negative for breast lumps  Respiratory ROS: no cough, shortness of breath, or wheezing  Cardiovascular ROS: no chest pain or dyspnea on exertion  Gastrointestinal ROS: no abdominal pain, change in bowel habits, or black or bloody stools  Genito-Urinary ROS: no dysuria, trouble voiding, or hematuria - - AUB  Musculoskeletal ROS: negative  Neurological ROS: no TIA or stroke symptoms  Dermatological ROS: negative          Physical Exam  Vitals reviewed  Constitutional:       Appearance: She is well-developed  Neck:      Thyroid: No thyromegaly  Cardiovascular:      Rate and Rhythm: Normal rate  Heart sounds: Normal heart sounds  Pulmonary:      Effort: Pulmonary effort is normal  No accessory muscle usage or respiratory distress  Chest:      Chest wall: No tenderness  Breasts: Breasts are symmetrical       Right: No inverted nipple, mass, tenderness or supraclavicular adenopathy  Left: No inverted nipple, mass, tenderness or supraclavicular adenopathy         Abdominal: General: There is no distension  Palpations: Abdomen is soft  Tenderness: There is no abdominal tenderness  There is no guarding or rebound  Genitourinary:     Exam position: Supine  Labia:         Right: No rash, tenderness, lesion or injury  Left: No rash, tenderness, lesion or injury  Vagina: Normal  No foreign body  No vaginal discharge or bleeding  Cervix: No cervical motion tenderness or discharge  Uterus: Enlarged (16-18 weeks )  Not fixed  Adnexa:         Right: No mass, tenderness or fullness  Left: No mass, tenderness or fullness  Rectum: No external hemorrhoid  Musculoskeletal:      Cervical back: Normal range of motion  Lymphadenopathy:      Cervical: No cervical adenopathy  Upper Body:      Right upper body: No supraclavicular adenopathy  Left upper body: No supraclavicular adenopathy  Neurological:      Mental Status: She is alert and oriented to person, place, and time  Psychiatric:         Speech: Speech normal          Behavior: Behavior normal          Thought Content:  Thought content normal          Judgment: Judgment normal

## 2022-06-07 LAB
HIV 1+2 AB+HIV1 P24 AG SERPL QL IA: NORMAL
RPR SER QL: NORMAL

## 2022-06-08 LAB
C TRACH DNA SPEC QL NAA+PROBE: NEGATIVE
N GONORRHOEA DNA SPEC QL NAA+PROBE: NEGATIVE

## 2023-04-26 ENCOUNTER — OFFICE VISIT (OUTPATIENT)
Dept: FAMILY MEDICINE CLINIC | Facility: CLINIC | Age: 38
End: 2023-04-26

## 2023-04-26 VITALS
OXYGEN SATURATION: 99 % | TEMPERATURE: 98.3 F | DIASTOLIC BLOOD PRESSURE: 64 MMHG | BODY MASS INDEX: 29.76 KG/M2 | WEIGHT: 157.6 LBS | HEART RATE: 51 BPM | SYSTOLIC BLOOD PRESSURE: 100 MMHG | HEIGHT: 61 IN

## 2023-04-26 DIAGNOSIS — Z13.0 SCREENING FOR DEFICIENCY ANEMIA: ICD-10-CM

## 2023-04-26 DIAGNOSIS — Z13.6 SCREENING FOR CARDIOVASCULAR CONDITION: ICD-10-CM

## 2023-04-26 DIAGNOSIS — F41.9 ANXIETY: ICD-10-CM

## 2023-04-26 DIAGNOSIS — Z23 NEED FOR VACCINATION: ICD-10-CM

## 2023-04-26 DIAGNOSIS — Z13.1 SCREENING FOR DIABETES MELLITUS: ICD-10-CM

## 2023-04-26 DIAGNOSIS — Z13.29 SCREENING FOR THYROID DISORDER: ICD-10-CM

## 2023-04-26 DIAGNOSIS — Z00.00 ANNUAL PHYSICAL EXAM: Primary | ICD-10-CM

## 2023-04-26 RX ORDER — SERTRALINE HYDROCHLORIDE 25 MG/1
25 TABLET, FILM COATED ORAL DAILY
Qty: 30 TABLET | Refills: 2 | Status: SHIPPED | OUTPATIENT
Start: 2023-04-26 | End: 2023-10-23

## 2023-04-26 NOTE — ASSESSMENT & PLAN NOTE
Patient suffering from symptoms of anxiety which have worsened over the last several months in anticipation of required return to office setting at work  Patient has been working from home for approximately 3 years since the beginning of the pandemic and is experiencing moderately severe symptoms of anxiety with the expectation that she will need to return to the office by July  We will treat with sertraline 25 mg daily and follow-up in 4 weeks however she may require permission from her employer to continue working at home due to the severity of her symptoms

## 2023-04-26 NOTE — PROGRESS NOTES
ADULT ANNUAL [address] [place] [address] [facility]    NAME: Rayna Sharma  AGE: 40 y o  SEX: female  : 1985     DATE: 2023     Assessment and Plan:     Patient was seen for annual physical and to establish care  Overall she is doing fairly well with the exception of her anxiety which is documented below  Her physical exam is unremarkable  She is up-to-date with gynecologic screening  Her immunizations will be updated today with Tdap  She is due for routine screening blood work which was ordered today as well  Problem List Items Addressed This Visit     Anxiety     Patient suffering from symptoms of anxiety which have worsened over the last several months in anticipation of required return to office setting at work  Patient has been working from home for approximately 3 years since the beginning of the pandemic and is experiencing moderately severe symptoms of anxiety with the expectation that she will need to return to the office by July  We will treat with sertraline 25 mg daily and follow-up in 4 weeks however she may require permission from her employer to continue working at home due to the severity of her symptoms  Relevant Medications    sertraline (ZOLOFT) 25 mg tablet   Other Visit Diagnoses     Annual physical exam    -  Primary    Need for vaccination        Relevant Orders    TDAP VACCINE GREATER THAN OR EQUAL TO 6YO IM (Completed)    Screening for cardiovascular condition        Relevant Orders    Lipid Panel with Direct LDL reflex    Screening for deficiency anemia        Relevant Orders    CBC and differential    Screening for diabetes mellitus        Relevant Orders    Comprehensive metabolic panel    Screening for thyroid disorder        Relevant Orders    TSH, 3rd generation with Free T4 reflex          Immunizations and preventive care screenings were discussed with patient today   Appropriate education was printed on patient's after visit summary  Counseling:  Alcohol/drug use: discussed moderation in alcohol intake, the recommendations for healthy alcohol use, and avoidance of illicit drug use  Dental Health: discussed importance of regular tooth brushing, flossing, and dental visits  Injury prevention: discussed safety/seat belts, safety helmets, smoke detectors, carbon dioxide detectors, and smoking near bedding or upholstery  Exercise: the importance of regular exercise/physical activity was discussed  Recommend exercise 3-5 times per week for at least 30 minutes  BMI Counseling: Body mass index is 30 02 kg/m²  The BMI is above normal  Nutrition recommendations include encouraging healthy choices of fruits and vegetables, limiting drinks that contain sugar and increasing intake of lean protein  Exercise recommendations include exercising 3-5 times per week  Rationale for BMI follow-up plan is due to patient being overweight or obese  Depression Screening and Follow-up Plan: Patient was screened for depression during today's encounter  They screened negative with a PHQ-2 score of 1  Return if symptoms worsen or fail to improve  Chief Complaint:     Chief Complaint   Patient presents with   • Establish Care   • Anxiety      History of Present Illness:     Adult Annual Physical   Patient here for a comprehensive physical exam  The patient reports no problems  Diet and Physical Activity  Diet/Nutrition: limited junk food and consuming 3-5 servings of fruits/vegetables daily  Exercise: 5-7 times a week on average  Depression Screening  PHQ-2/9 Depression Screening    Little interest or pleasure in doing things: 0 - not at all  Feeling down, depressed, or hopeless: 1 - several days  PHQ-2 Score: 1  PHQ-2 Interpretation: Negative depression screen       General Health  Sleep: gets 7-8 hours of sleep on average  Hearing: normal - bilateral   Vision: no vision problems     Dental: no dental visits for >1 year  /GYN Health  Last menstrual period: regular/ monthly  Contraceptive method: none  History of STDs?: no      Review of Systems:     Review of Systems   Constitutional: Negative  HENT: Negative  Negative for congestion, ear pain, hearing loss, nosebleeds, sore throat and trouble swallowing  Eyes: Negative  Respiratory: Negative  Negative for apnea, cough, chest tightness, shortness of breath and wheezing  Cardiovascular: Negative  Gastrointestinal: Negative  Negative for abdominal pain, blood in stool, constipation, diarrhea, nausea and vomiting  Endocrine: Negative  Genitourinary: Negative  Negative for difficulty urinating, dysuria, frequency, hematuria and urgency  Musculoskeletal: Negative  Negative for arthralgias, joint swelling and myalgias  Skin: Negative for rash  Neurological: Negative for dizziness, syncope, light-headedness, numbness and headaches  Hematological: Negative  Psychiatric/Behavioral: Negative for confusion, dysphoric mood and sleep disturbance  The patient is nervous/anxious         Past Medical History:     Past Medical History:   Diagnosis Date   • Anxiety       Past Surgical History:     Past Surgical History:   Procedure Laterality Date   •  SECTION     • TUBAL LIGATION        Social History:     Social History     Socioeconomic History   • Marital status: Single     Spouse name: None   • Number of children: None   • Years of education: None   • Highest education level: None   Occupational History   • None   Tobacco Use   • Smoking status: Former     Passive exposure: Never   • Smokeless tobacco: Never   Substance and Sexual Activity   • Alcohol use: No   • Drug use: No   • Sexual activity: Yes     Partners: Male     Birth control/protection: Female Sterilization   Other Topics Concern   • None   Social History Narrative   • None     Social Determinants of Health     Financial Resource Strain: Not on file   Food "Insecurity: Not on file   Transportation Needs: Not on file   Physical Activity: Not on file   Stress: Not on file   Social Connections: Not on file   Intimate Partner Violence: Not on file   Housing Stability: Not on file      Family History:     Family History   Problem Relation Age of Onset   • Cirrhosis Father    • Alcohol abuse Father    • Breast cancer Paternal Grandmother       Current Medications:     Current Outpatient Medications   Medication Sig Dispense Refill   • sertraline (ZOLOFT) 25 mg tablet Take 1 tablet (25 mg total) by mouth daily 30 tablet 2   • tretinoin (REFISSA) 0 05 % cream PLEASE SEE ATTACHED FOR DETAILED DIRECTIONS       No current facility-administered medications for this visit  Allergies:     No Known Allergies   Physical Exam:     /64 (BP Location: Left arm, Patient Position: Sitting, Cuff Size: Adult)   Pulse (!) 51   Temp 98 3 °F (36 8 °C)   Ht 5' 0 75\" (1 543 m)   Wt 71 5 kg (157 lb 9 6 oz)   LMP 04/05/2023 (Exact Date)   SpO2 99%   BMI 30 02 kg/m²     Physical Exam  Vitals and nursing note reviewed  Constitutional:       Appearance: She is well-developed  HENT:      Head: Normocephalic and atraumatic  Eyes:      Pupils: Pupils are equal, round, and reactive to light  Cardiovascular:      Rate and Rhythm: Normal rate and regular rhythm  Heart sounds: Normal heart sounds  Pulmonary:      Effort: Pulmonary effort is normal       Breath sounds: Normal breath sounds  Abdominal:      General: Bowel sounds are normal       Palpations: Abdomen is soft  Musculoskeletal:         General: Normal range of motion  Cervical back: Normal range of motion and neck supple  Skin:     General: Skin is warm and dry  Capillary Refill: Capillary refill takes less than 2 seconds  Neurological:      Mental Status: She is alert and oriented to person, place, and time  Psychiatric:         Behavior: Behavior normal          Thought Content:  Thought content " normal          Judgment: Judgment normal           Hardy MehtaDO leslye   [facility]

## 2023-05-31 ENCOUNTER — OFFICE VISIT (OUTPATIENT)
Dept: FAMILY MEDICINE CLINIC | Facility: CLINIC | Age: 38
End: 2023-05-31

## 2023-05-31 VITALS
SYSTOLIC BLOOD PRESSURE: 110 MMHG | TEMPERATURE: 97.7 F | WEIGHT: 157 LBS | DIASTOLIC BLOOD PRESSURE: 70 MMHG | HEART RATE: 74 BPM | HEIGHT: 61 IN | BODY MASS INDEX: 29.64 KG/M2 | OXYGEN SATURATION: 94 % | RESPIRATION RATE: 16 BRPM

## 2023-05-31 DIAGNOSIS — F41.9 ANXIETY: Primary | ICD-10-CM

## 2023-05-31 NOTE — LETTER
May 31, 2023     Patient: Pierce Pablo  YOB: 1985  Date of Visit: 5/31/2023      To Whom it May Concern:    Pierce Pablo is under my professional care  Joshua Turner was seen in my office on 5/31/2023  It is my professional opinion that she suffers from anxiety  Symptoms of her anxiety are significantly exacerbated by working in the office due to multiple stressors she would experience there  Her symptoms are well controlled when she is able to work from home and I believe it is in her best interest medically to continue working from home  If you have any questions or concerns, please don't hesitate to call           Sincerely,          Alecia Sherman,         CC: No Recipients

## 2023-05-31 NOTE — PROGRESS NOTES
"Name: Kurt Moritz      : 1985      MRN: [id number]  Encounter Provider: Gorge Aschoff, DO  Encounter Date: 2023   Encounter department: [address] [place] [address]  Anxiety  Assessment & Plan:  Patient with generalized anxiety  She is unresponsive to SSRI due to immediate side effects  She is reluctant to consider additional medications due to previous side effects from medication  She feels she is able to manage her anxiety when she is able to work for home  Most significant triggers involve triggers that she experiences in the workplace  At this point she will likely benefit from a work from home situation  She will approach her employer about obtaining approval for that  Subjective      Patient returns to the office to follow-up on her anxiety  She took sertraline but was only able to stay on it for approximately 1 week  She noticed significant side effects including agitation and personality changes  She notes that she has very strong reactions to medications and has run into this problem in the past     Review of Systems   Constitutional: Negative  Respiratory: Negative  Cardiovascular: Negative  Gastrointestinal: Negative  Genitourinary: Negative  Musculoskeletal: Negative  Psychiatric/Behavioral: The patient is nervous/anxious          Current Outpatient Medications on File Prior to Visit   Medication Sig   • tretinoin (REFISSA) 0 05 % cream PLEASE SEE ATTACHED FOR DETAILED DIRECTIONS (Patient not taking: Reported on 2023)   • [DISCONTINUED] sertraline (ZOLOFT) 25 mg tablet Take 1 tablet (25 mg total) by mouth daily (Patient not taking: Reported on 2023)       Objective     /70 (BP Location: Right arm, Patient Position: Sitting, Cuff Size: Adult)   Pulse 74   Temp 97 7 °F (36 5 °C) (Temporal)   Resp 16   Ht 5' 0 63\" (1 54 m)   Wt 71 2 kg (157 lb)   LMP 2023   SpO2 94%   BMI 30 03 kg/m² " Physical Exam  Constitutional:       Appearance: She is well-developed  HENT:      Head: Normocephalic and atraumatic  Eyes:      Pupils: Pupils are equal, round, and reactive to light  Pulmonary:      Effort: Pulmonary effort is normal  No respiratory distress  Musculoskeletal:      Cervical back: Normal range of motion  Neurological:      Mental Status: She is alert and oriented to person, place, and time  Psychiatric:         Behavior: Behavior normal          Thought Content:  Thought content normal          Judgment: Judgment normal        Chidi Ascleónff, DO

## 2023-05-31 NOTE — ASSESSMENT & PLAN NOTE
Patient with generalized anxiety  She is unresponsive to SSRI due to immediate side effects  She is reluctant to consider additional medications due to previous side effects from medication  She feels she is able to manage her anxiety when she is able to work for home  Most significant triggers involve triggers that she experiences in the workplace  At this point she will likely benefit from a work from home situation  She will approach her employer about obtaining approval for that

## 2023-06-14 ENCOUNTER — TELEPHONE (OUTPATIENT)
Dept: FAMILY MEDICINE CLINIC | Facility: CLINIC | Age: 38
End: 2023-06-14

## 2023-07-11 ENCOUNTER — TELEPHONE (OUTPATIENT)
Dept: FAMILY MEDICINE CLINIC | Facility: CLINIC | Age: 38
End: 2023-07-11

## 2023-07-14 NOTE — TELEPHONE ENCOUNTER
Questions are very detailed and would need to be completed by treating provider.  Forms placed at PCP desk

## 2024-10-14 ENCOUNTER — TELEPHONE (OUTPATIENT)
Age: 39
End: 2024-10-14

## 2024-10-14 NOTE — TELEPHONE ENCOUNTER
RN placed a call to patient in regards to labs. Per patient, does not have any symptoms or concerns for exposure, however, has just started dating a new partner and is being proactive about screening self and her health. Would like not to wait until January. RN advised will have to double check with provider if OK as its been >1 year since she was last seen for OBGYN care. Pt verbalized an understanding. Aware staff will return a call with update. No further questions.

## 2024-10-17 ENCOUNTER — TELEPHONE (OUTPATIENT)
Dept: OBGYN CLINIC | Facility: CLINIC | Age: 39
End: 2024-10-17

## 2024-10-17 DIAGNOSIS — Z72.51 HIGH RISK SEXUAL BEHAVIOR, UNSPECIFIED TYPE: Primary | ICD-10-CM

## 2024-10-17 NOTE — TELEPHONE ENCOUNTER
Patient reached out regarding STD testing due to seeing a new partner. She is due for her yearly in January but would like to not have to wait that long. Labs were placed in her chart for her to have done outpatient.     Talked  with Neeta and she was thankful and will get the labs done soon.

## 2025-01-13 ENCOUNTER — APPOINTMENT (OUTPATIENT)
Dept: LAB | Facility: CLINIC | Age: 40
End: 2025-01-13
Payer: COMMERCIAL

## 2025-01-13 ENCOUNTER — ANNUAL EXAM (OUTPATIENT)
Dept: OBGYN CLINIC | Facility: CLINIC | Age: 40
End: 2025-01-13
Payer: COMMERCIAL

## 2025-01-13 VITALS
BODY MASS INDEX: 31.69 KG/M2 | SYSTOLIC BLOOD PRESSURE: 114 MMHG | WEIGHT: 161.4 LBS | DIASTOLIC BLOOD PRESSURE: 64 MMHG | HEIGHT: 60 IN

## 2025-01-13 DIAGNOSIS — Z72.51 HIGH RISK SEXUAL BEHAVIOR, UNSPECIFIED TYPE: ICD-10-CM

## 2025-01-13 DIAGNOSIS — Z12.31 ENCOUNTER FOR SCREENING MAMMOGRAM FOR MALIGNANT NEOPLASM OF BREAST: ICD-10-CM

## 2025-01-13 DIAGNOSIS — Z01.419 ENCOUNTER FOR WELL WOMAN EXAM WITH ROUTINE GYNECOLOGICAL EXAM: Primary | ICD-10-CM

## 2025-01-13 PROCEDURE — 87389 HIV-1 AG W/HIV-1&-2 AB AG IA: CPT

## 2025-01-13 PROCEDURE — 36415 COLL VENOUS BLD VENIPUNCTURE: CPT

## 2025-01-13 PROCEDURE — 86780 TREPONEMA PALLIDUM: CPT

## 2025-01-13 PROCEDURE — G0145 SCR C/V CYTO,THINLAYER,RESCR: HCPCS | Performed by: OBSTETRICS & GYNECOLOGY

## 2025-01-13 PROCEDURE — G0476 HPV COMBO ASSAY CA SCREEN: HCPCS | Performed by: OBSTETRICS & GYNECOLOGY

## 2025-01-13 PROCEDURE — 99395 PREV VISIT EST AGE 18-39: CPT | Performed by: OBSTETRICS & GYNECOLOGY

## 2025-01-13 PROCEDURE — 87340 HEPATITIS B SURFACE AG IA: CPT

## 2025-01-13 NOTE — PROGRESS NOTES
ASSESSMENT & PLAN: Neeta Bailey is a 39 y.o.  with normal gynecologic exam.    1.  Routine well woman exam done today  2.  Pap and HPV:  The patient's last pap and hpv was .    It was normal.    Pap and cotesting was done today.    Current ASCCP Guidelines reviewed.   3.  The following were reviewed in today's visit: breast self exam, mammography screening ordered, exercise, and healthy diet.      CC:  Annual Gynecologic Examination    HPI: Neeta Bailey is a 39 y.o.  who presents for annual gynecologic examination.  She has the following concerns:  none/ desires STD testing   Has noticed her cycles are heavier since tubal      Health Maintenance:    She wears her seatbelt routinely.    She does perform regular monthly self breast exams.    She feels safe at home.     Past Medical History:   Diagnosis Date    Anxiety        Past Surgical History:   Procedure Laterality Date     SECTION      TUBAL LIGATION         Past OB/Gyn History:  OB History          3    Para   3    Term   3            AB        Living   3         SAB        IAB        Ectopic        Multiple        Live Births   3                 Family History   Problem Relation Age of Onset    Cirrhosis Father     Alcohol abuse Father     Breast cancer Paternal Grandmother        Social History:  Social History     Socioeconomic History    Marital status: Single     Spouse name: Not on file    Number of children: Not on file    Years of education: Not on file    Highest education level: Not on file   Occupational History    Not on file   Tobacco Use    Smoking status: Former     Passive exposure: Never    Smokeless tobacco: Never   Vaping Use    Vaping status: Never Used   Substance and Sexual Activity    Alcohol use: No    Drug use: No    Sexual activity: Yes     Partners: Male     Birth control/protection: Female Sterilization   Other Topics Concern    Not on file   Social History Narrative    Not on file     Social  Drivers of Health     Financial Resource Strain: Not on file   Food Insecurity: Not on file   Transportation Needs: Not on file   Physical Activity: Not on file   Stress: Not on file   Social Connections: Not on file   Intimate Partner Violence: Not on file   Housing Stability: Not on file       No Known Allergies      Current Outpatient Medications:     tretinoin (REFISSA) 0.05 % cream, PLEASE SEE ATTACHED FOR DETAILED DIRECTIONS (Patient not taking: Reported on 1/13/2025), Disp: , Rfl:       Review of Systems  Constitutional :no fever, feels well, no tiredness, no recent weight gain or loss  ENT: no ear ache, no loss of hearing, no nosebleeds or nasal discharge, no sore throat or hoarseness.  Cardiovascular: no complaints of slow or fast heart beat, no chest pain, no palpitations, no leg claudication or lower extremity edema.  Respiratory: no complaints of shortness of shortness of breath, no STERN  Breasts:no complaints of breast pain, breast lump, or nipple discharge  Gastrointestinal: no complaints of abdominal pain, constipation, nausea, vomiting, or diarrhea or bloody stools  Genitourinary : no complaints of dysuria, incontinence, pelvic pain, no dysmenorrhea, vaginal discharge or abnormal vaginal bleeding and as noted in HPI.  Musculoskeletal: no complaints of arthralgia, no myalgia, no joint swelling or stiffness, no limb pain or swelling.  Integumentary: no complaints of skin rash or lesion, itching or dry skin  Neurological: no complaints of headache, no confusion, no numbness or tingling, no dizziness or fainting    Objective      /64   Ht 5' (1.524 m)   Wt 73.2 kg (161 lb 6.4 oz)   LMP 01/10/2025 (Exact Date)   BMI 31.52 kg/m²   General:   appears stated age, cooperative, alert normal mood and affect   Lungs: Unlabored     Breasts: normal appearance, no masses or tenderness, Inspection negative, No nipple retraction or dimpling, No nipple discharge or bleeding, No axillary or supraclavicular  adenopathy, Normal to palpation without dominant masses   Abdomen: soft, non-tender, without masses or organomegaly   Vulva: normal, normal female genitalia, Bartholin's, Urethra, [place] normal, no lesions, normal female hair distribution, no clitoral enlargement   Vagina: normal vagina, no discharge, exudate, lesion, or erythema   Urethra: normal   Cervix: Normal, no discharge. Nontender.   Uterus: normal size, contour, position, consistency, mobility, non-tender   Adnexa: no mass, fullness, tenderness   Psychiatric orientation to person, place, and time: normal. mood and affect: normal

## 2025-01-14 LAB
C TRACH DNA SPEC QL NAA+PROBE: NEGATIVE
HBV SURFACE AG SER QL: NORMAL
HIV 1+2 AB+HIV1 P24 AG SERPL QL IA: NORMAL
HIV 2 AB SERPL QL IA: NORMAL
HIV1 AB SERPL QL IA: NORMAL
HIV1 P24 AG SERPL QL IA: NORMAL
HPV HR 12 DNA CVX QL NAA+PROBE: POSITIVE
HPV16 DNA CVX QL NAA+PROBE: NEGATIVE
HPV18 DNA CVX QL NAA+PROBE: NEGATIVE
N GONORRHOEA DNA SPEC QL NAA+PROBE: NEGATIVE
TREPONEMA PALLIDUM IGG+IGM AB [PRESENCE] IN SERUM OR PLASMA BY IMMUNOASSAY: NORMAL

## 2025-01-20 ENCOUNTER — RESULTS FOLLOW-UP (OUTPATIENT)
Dept: OBGYN CLINIC | Facility: CLINIC | Age: 40
End: 2025-01-20

## 2025-01-20 LAB
LAB AP GYN PRIMARY INTERPRETATION: NORMAL
Lab: NORMAL

## 2025-01-21 NOTE — TELEPHONE ENCOUNTER
Pt calling in for results, pt was notified of the providers recommendations, patient verbalized understanding. Pt asking if HPV was a STD, pt was notified that its a virus that can lay dormant in the body, pt verbalized understanding.

## 2025-06-25 ENCOUNTER — PATIENT MESSAGE (OUTPATIENT)
Dept: FAMILY MEDICINE CLINIC | Facility: CLINIC | Age: 40
End: 2025-06-25

## 2025-06-25 ENCOUNTER — TELEPHONE (OUTPATIENT)
Age: 40
End: 2025-06-25

## 2025-06-25 NOTE — TELEPHONE ENCOUNTER
Patient would like to be scheduled sooner:    Reason for needing a sooner appointment: Patient needs ADA paperwork filled out in order to work from home    PCP ONLY or Okay with seeing another provider    Is the patient Currently on the wait list? No    Please review with provider if able to accommodate a sooner appointment.

## 2025-07-01 ENCOUNTER — OFFICE VISIT (OUTPATIENT)
Dept: FAMILY MEDICINE CLINIC | Facility: CLINIC | Age: 40
End: 2025-07-01
Payer: COMMERCIAL

## 2025-07-01 VITALS
HEIGHT: 61 IN | HEART RATE: 63 BPM | TEMPERATURE: 97.2 F | SYSTOLIC BLOOD PRESSURE: 124 MMHG | BODY MASS INDEX: 31.34 KG/M2 | OXYGEN SATURATION: 99 % | WEIGHT: 166 LBS | DIASTOLIC BLOOD PRESSURE: 78 MMHG

## 2025-07-01 DIAGNOSIS — Z13.6 SCREENING FOR CARDIOVASCULAR CONDITION: ICD-10-CM

## 2025-07-01 DIAGNOSIS — Z13.29 SCREENING FOR THYROID DISORDER: ICD-10-CM

## 2025-07-01 DIAGNOSIS — Z13.0 SCREENING FOR DEFICIENCY ANEMIA: ICD-10-CM

## 2025-07-01 DIAGNOSIS — F41.9 ANXIETY: Primary | ICD-10-CM

## 2025-07-01 DIAGNOSIS — Z13.1 SCREENING FOR DIABETES MELLITUS: ICD-10-CM

## 2025-07-01 PROCEDURE — 99213 OFFICE O/P EST LOW 20 MIN: CPT | Performed by: FAMILY MEDICINE

## 2025-07-01 NOTE — PROGRESS NOTES
Name: Neeta Bailey      : 1985      MRN: [id number]  Encounter Provider: Pa Luke DO  Encounter Date: 2025   Encounter department: [facility]    Assessment & Plan  Anxiety  Patient with continued situational anxiety.  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  Patient has severe symptoms of anxiety with increased heart rate, difficulty concentrating.  Symptoms are almost completely managed when she is able to work from home.  She does not require medication in that scenario.  It would continue to serve her medical condition best that she be able to continue working from home.       Screening for deficiency anemia    Orders:  •  CBC and differential; Future    Screening for diabetes mellitus    Orders:  •  Comprehensive metabolic panel; Future    Screening for thyroid disorder    Orders:  •  TSH, 3rd generation with Free T4 reflex; Future    Screening for cardiovascular condition    Orders:  •  Lipid Panel with Direct LDL reflex; Future         History of Present Illness     Patient presents to follow-up on her anxiety.  Her symptoms of anxiety began in .  At that time she was working remotely from home due to the COVID-19 pandemic.  As the pandemic was ending and her employer was preparing to bring employees back into the office, patient began to experience increasing levels of anxiety.  Her anxiety was significantly triggered and exacerbated by going to the office.  Treatment attempts were made with an SSRI medication which caused immediate and significant side effects.  Since that time she has continued to work remotely and as long as she has been able to do that she has been able to manage her anxiety symptoms well.  At this time she needs verification of her ongoing medical condition for continued ability to work from home.      Review of Systems   Constitutional: Negative.    Respiratory: Negative.     Cardiovascular: Negative.    Gastrointestinal:  "Negative.    Genitourinary: Negative.    Musculoskeletal: Negative.    Psychiatric/Behavioral:  The patient is nervous/anxious.      Past Medical History[1]  Past Surgical History[2]  Family History[3]  Social History[4]  Medications[5]  No Known Allergies  Immunization History   Administered Date(s) Administered   • Hep A / Hep B 12/10/2003   • Tdap 2023     Objective   /78   Pulse 63   Temp (!) 97.2 °F (36.2 °C)   Ht 5' 1\" (1.549 m)   Wt 75.3 kg (166 lb)   LMP 2025   SpO2 99%   BMI 31.37 kg/m²     Physical Exam  Vitals and nursing note reviewed.   Constitutional:       General: She is not in acute distress.     Appearance: Normal appearance.   HENT:      Head: Normocephalic and atraumatic.     Eyes:      Pupils: Pupils are equal, round, and reactive to light.       Cardiovascular:      Rate and Rhythm: Normal rate and regular rhythm.      Pulses: Normal pulses.      Heart sounds: Normal heart sounds.   Pulmonary:      Effort: Pulmonary effort is normal.      Breath sounds: Normal breath sounds.     Musculoskeletal:         General: Normal range of motion.      Cervical back: Normal range of motion.     Skin:     General: Skin is warm and dry.     Neurological:      General: No focal deficit present.      Mental Status: She is alert and oriented to person, place, and time. Mental status is at baseline.     Psychiatric:         Mood and Affect: Mood normal.         Behavior: Behavior normal.         Thought Content: Thought content normal.         Judgment: Judgment normal.                [1]  Past Medical History:  Diagnosis Date   • Anxiety    [2]  Past Surgical History:  Procedure Laterality Date   •  SECTION     • TUBAL LIGATION     [3]  Family History  Problem Relation Name Age of Onset   • Cirrhosis Father     • Alcohol abuse Father     • Breast cancer Paternal Grandmother     [4]  Social History  Tobacco Use   • Smoking status: Former     Passive exposure: Never   • " Smokeless tobacco: Never   Vaping Use   • Vaping status: Never Used   Substance and Sexual Activity   • Alcohol use: No   • Drug use: No   • Sexual activity: Yes     Partners: Male     Birth control/protection: Female Sterilization   [5]  Current Outpatient Medications on File Prior to Visit   Medication Sig   • tretinoin (REFISSA) 0.05 % cream PLEASE SEE ATTACHED FOR DETAILED DIRECTIONS (Patient not taking: Reported on 7/1/2025)

## 2025-07-01 NOTE — ASSESSMENT & PLAN NOTE
Patient with continued situational anxiety.  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  Patient has severe symptoms of anxiety with increased heart rate, difficulty concentrating.  Symptoms are almost completely managed when she is able to work from home.  She does not require medication in that scenario.  It would continue to serve her medical condition best that she be able to continue working from home.

## 2025-07-01 NOTE — PATIENT COMMUNICATION
Lmom for patient. Letting her know her ADA paperwork is complete but we need the last page signed before it can be faxed.   Paperwork in brown folder at .               Lmom for patient. ADA paperwork needs her signature on the last page before it can be faxed. Told patient to drop by to sign it and then we will fax.

## 2025-07-28 ENCOUNTER — TELEPHONE (OUTPATIENT)
Age: 40
End: 2025-07-28